# Patient Record
Sex: MALE | Race: WHITE | NOT HISPANIC OR LATINO | Employment: OTHER | URBAN - METROPOLITAN AREA
[De-identification: names, ages, dates, MRNs, and addresses within clinical notes are randomized per-mention and may not be internally consistent; named-entity substitution may affect disease eponyms.]

---

## 2024-02-11 ASSESSMENT — SLIT LAMP EXAM - LIDS
COMMENTS: GOOD POSITION, DERMATOCHALASIS
COMMENTS: GOOD POSITION, DERMATOCHALASIS

## 2024-02-11 ASSESSMENT — EXTERNAL EXAM - RIGHT EYE: OD_EXAM: NORMAL

## 2024-02-11 ASSESSMENT — EXTERNAL EXAM - LEFT EYE: OS_EXAM: NORMAL

## 2024-02-11 ASSESSMENT — CUP TO DISC RATIO
OD_RATIO: .2, PPA
OS_RATIO: .2, PPA

## 2024-02-11 NOTE — PROGRESS NOTES
Combined form of age-related cataract, right eyeH25.811  Combined form of age-related cataract, left eyeH25.812  -Mildly visually significant, but no impact on ADL`s. Symptoms: Denies trouble seeing signs when driving. Okay seeing words/numbers on TV. Monitor for now. F/u 1 year to re-evaluate -- recheck refraction, glare/BAT, dilation. Plan for Lenstar/OCT macula/Pentacam only if cataract(s) visually significant. F/u sooner if vision worsens before then.   -Advised to wear glasses for driving and for reading. Patient with natural monovision at this time, but he sees better at distance with glasses on, and feels he has to use glasses for reading. For surgical planning, consider plano aim OS to reduce anisometropia and allow for best uncorrected distance vision to match OD. Per daughter, patient does not read much.   Caution - Tamsulosin    XrxaxpunpB79.00  IcfubcL05.10  HyfdpcgarwA75.4  KzrvqhypoysybU66.31  -Difficult refraction, consider tumbling E's if unable to read letters/numbers  -Per patient's daughter, he does not drive much at this time.   -Likely long history of anisometropia with natural monovision since it is unlikely that he had a myopic shift in Rx from cataracts that long ago. Cataracts appear fairly symmetric between both eyes. Guarded visual prognosis with cataract surgery due to possible mild amblyopia OS due to anisometropia.  -Continue current glasses - has bifocals. May also consider separate DVO/NVO as needed.       No history of intraocular surgery/refractive surgery.   No FH of AMD/glaucoma    Language: Yony

## 2024-02-12 ENCOUNTER — OFFICE VISIT (OUTPATIENT)
Dept: OPHTHALMOLOGY | Facility: CLINIC | Age: 69
End: 2024-02-12
Payer: COMMERCIAL

## 2024-02-12 DIAGNOSIS — H25.811 COMBINED FORM OF AGE-RELATED CATARACT, RIGHT EYE: Primary | ICD-10-CM

## 2024-02-12 DIAGNOSIS — H52.4 PRESBYOPIA: ICD-10-CM

## 2024-02-12 DIAGNOSIS — H52.31 ANISOMETROPIA: ICD-10-CM

## 2024-02-12 DIAGNOSIS — H25.812 COMBINED FORM OF AGE-RELATED CATARACT, LEFT EYE: ICD-10-CM

## 2024-02-12 DIAGNOSIS — H52.12 MYOPIA OF LEFT EYE: ICD-10-CM

## 2024-02-12 DIAGNOSIS — H52.01 HYPEROPIA OF RIGHT EYE: ICD-10-CM

## 2024-02-12 PROCEDURE — 92014 COMPRE OPH EXAM EST PT 1/>: CPT | Performed by: OPHTHALMOLOGY

## 2024-02-12 RX ORDER — ATORVASTATIN CALCIUM 10 MG/1
10 TABLET, FILM COATED ORAL
COMMUNITY
Start: 2023-08-23

## 2024-02-12 ASSESSMENT — TONOMETRY
OS_IOP_MMHG: 18
IOP_METHOD: GOLDMANN APPLANATION
OD_IOP_MMHG: 19

## 2024-02-12 ASSESSMENT — REFRACTION_WEARINGRX
OD_SPHERE: +1.50
OS_CYLINDER: -0.50
OD_ADD: +2.00
OS_ADD: +2.00
OS_AXIS: 040
OS_SPHERE: -3.50
OD_CYLINDER: SPHERE

## 2024-02-12 ASSESSMENT — REFRACTION_MANIFEST
OD_SPHERE: +1.50
OD_ADD: +2.50
OD_CYLINDER: SPHERE
OS_CYLINDER: -0.50
OS_ADD: +2.50
OS_SPHERE: -3.25
OS_AXIS: 040

## 2024-02-12 ASSESSMENT — VISUAL ACUITY
OS_SC: 20/200
OS_BAT_MED: 20/40
METHOD: SNELLEN - LINEAR
OD_SC: 20/70
OD_BAT_MED: 20/40

## 2024-02-12 ASSESSMENT — ENCOUNTER SYMPTOMS: EYES NEGATIVE: 1

## 2024-02-12 NOTE — LETTER
2024      Jeana Jasmine MD  [address]  [facility] [address]      Patient name: Alayna Calabrese  Patient : 1955      Dear Jeana Jasmine MD,    I had the pleasure of seeing Alayna Calabrese for an eye examination. Please see below for my impression and plan. Thank you for allowing me to participate in the care of this very pleasant patient. Please do not hesitate to contact me if you have any questions.    Sincerely,  Capri Caraballo MD      Impression and Plan:  Combined form of age-related cataract, right eyeH25.811  Combined form of age-related cataract, left eyeH25.812  -Mildly visually significant, but no impact on ADL`s. Symptoms: Denies trouble seeing signs when driving. Okay seeing words/numbers on TV. Monitor for now. F/u 1 year to re-evaluate -- recheck refraction, glare/BAT, dilation. Plan for Lenstar/OCT macula/Pentacam only if cataract(s) visually significant. F/u sooner if vision worsens before then.   -Advised to wear glasses for driving and for reading. Patient with natural monovision at this time, but he sees better at distance with glasses on, and feels he has to use glasses for reading. For surgical planning, consider plano aim OS to reduce anisometropia and allow for best uncorrected distance vision to match OD. Per daughter, patient does not read much.   Caution - Tamsulosin    LwvcphtzjY76.00  ImgbyfU73.10  DvxmhsnpmbX45.4  YfdlwssinkwmoM44.31  -Difficult refraction, consider tumbling E's if unable to read letters/numbers  -Per patient's daughter, he does not drive much at this time.   -Likely long history of anisometropia with natural monovision since it is unlikely that he had a myopic shift in Rx from cataracts that long ago. Cataracts appear fairly symmetric between both eyes. Guarded visual prognosis with cataract surgery due to possible mild amblyopia OS due to anisometropia.  -Continue current glasses - has bifocals. May also consider  separate DVO/NVO as needed.

## 2024-02-13 ENCOUNTER — HOSPITAL ENCOUNTER (OUTPATIENT)
Dept: RADIOLOGY | Facility: EXTERNAL LOCATION | Age: 69
Discharge: HOME | End: 2024-02-13

## 2024-02-13 DIAGNOSIS — M25.532 LEFT WRIST PAIN: ICD-10-CM

## 2024-02-13 DIAGNOSIS — M25.522 LEFT ELBOW PAIN: ICD-10-CM

## 2024-02-15 ENCOUNTER — APPOINTMENT (OUTPATIENT)
Dept: ORTHOPEDIC SURGERY | Facility: HOSPITAL | Age: 69
End: 2024-02-15
Payer: COMMERCIAL

## 2024-02-19 ENCOUNTER — APPOINTMENT (OUTPATIENT)
Dept: ORTHOPEDIC SURGERY | Facility: CLINIC | Age: 69
End: 2024-02-19
Payer: COMMERCIAL

## 2024-02-20 ENCOUNTER — HOSPITAL ENCOUNTER (OUTPATIENT)
Dept: RADIOLOGY | Facility: CLINIC | Age: 69
Discharge: HOME | End: 2024-02-20
Payer: COMMERCIAL

## 2024-02-20 ENCOUNTER — OFFICE VISIT (OUTPATIENT)
Dept: ORTHOPEDIC SURGERY | Facility: CLINIC | Age: 69
End: 2024-02-20
Payer: COMMERCIAL

## 2024-02-20 DIAGNOSIS — M25.522 LEFT ELBOW PAIN: ICD-10-CM

## 2024-02-20 DIAGNOSIS — S42.492A CLOSED BICONDYLAR FRACTURE OF DISTAL HUMERUS, LEFT, INITIAL ENCOUNTER: Primary | ICD-10-CM

## 2024-02-20 PROCEDURE — 99204 OFFICE O/P NEW MOD 45 MIN: CPT | Performed by: STUDENT IN AN ORGANIZED HEALTH CARE EDUCATION/TRAINING PROGRAM

## 2024-02-20 PROCEDURE — 1126F AMNT PAIN NOTED NONE PRSNT: CPT | Performed by: STUDENT IN AN ORGANIZED HEALTH CARE EDUCATION/TRAINING PROGRAM

## 2024-02-20 PROCEDURE — 1159F MED LIST DOCD IN RCRD: CPT | Performed by: STUDENT IN AN ORGANIZED HEALTH CARE EDUCATION/TRAINING PROGRAM

## 2024-02-20 PROCEDURE — 73070 X-RAY EXAM OF ELBOW: CPT | Mod: LEFT SIDE | Performed by: RADIOLOGY

## 2024-02-20 PROCEDURE — 1160F RVW MEDS BY RX/DR IN RCRD: CPT | Performed by: STUDENT IN AN ORGANIZED HEALTH CARE EDUCATION/TRAINING PROGRAM

## 2024-02-20 PROCEDURE — 73070 X-RAY EXAM OF ELBOW: CPT | Mod: LT

## 2024-02-20 ASSESSMENT — PAIN SCALES - GENERAL: PAINLEVEL_OUTOF10: 0 - NO PAIN

## 2024-02-20 ASSESSMENT — PAIN - FUNCTIONAL ASSESSMENT: PAIN_FUNCTIONAL_ASSESSMENT: 0-10

## 2024-02-20 ASSESSMENT — PAIN DESCRIPTION - DESCRIPTORS: DESCRIPTORS: ACHING

## 2024-02-20 NOTE — PROGRESS NOTES
Orthopaedic Surgery  New Patient Clinic Note    Alayna Calabrese [id number] February 20, 2024    Reason for Consult: Left intra-articular distal humerus fracture    HPI: 68 y.o. right-hand-dominant male presenting with a ground-level fall at least 2 weeks ago.  He was complaining of left elbow pain but did not note this to his daughter until she was visiting a week later.  At that point he presented to urgent care and left elbow x-rays demonstrated an intra-articular distal humerus fracture.  He was placed into a splint and has remained in this since.  The swelling has improved.  He had denies any neurovascular issues.  Functionally he is retired and only does light work around the house.  He does some light gardening and house care but does not do any cooking or cleaning in the house.  He does not drive.  He does not have diabetes.  He does not smoke.    ROS:  A complete review of systems was conducted, pertinent only to the HPI noted above.     Constitutional: None     Eyes: No additions to above history     Ears, Nose, Throat: No additions to above history     Cardiovascular: No additions to above history     Respiratory: No additions to above history     GI: No additions to above history     : No additions to above history     Skin/Neuro: No additions to above history     Endocrine/Heme/Lymph: No additions to above history     Immunologic: No additions to above history     Psychiatric: No additions to above history     Musculoskeletal: see above    PMH:   Past Medical History:   Diagnosis Date    Personal history of other diseases of the circulatory system     History of hypertension    Unspecified cataract     Cataracts, both eyes    No history of DVT.     PSH:    Past Surgical History:   Procedure Laterality Date    OTHER SURGICAL HISTORY  03/08/2021    No history of surgery       SHx: No smoking. daily Alcohol, No IVDU    Meds:   Current Outpatient Medications on File Prior to Visit   Medication Sig Dispense  Refill    atorvastatin (Lipitor) 10 mg tablet Take 1 tablet (10 mg) by mouth once daily.       No current facility-administered medications on file prior to visit.       PHYSICAL EXAM    GEN: AaOx4, NAD  HEENT: normocephalic atraumatic, EOMI, MMM, pupils equal and round  PSYCH: appropriate mood and affect  RESP: nonlabored breathing on RA  CARDIAC: Extremities WWP, RRR to peripheral palpation  NEURO: CN 2-12 grossly intact    LUE:   - Skin closed with significant swelling from elbow to hand. No erythema, bruising, open wounds.  - ROM of L elbow from 30-90. TTP over affected area  -Motor/sensory intact in m/u/r  -hand wwp, brisk cap refill, 2+ radial pulse    Imaging:    XR of the left elbow, obtained and personally reviewed today, shows low distal humerus fracture with intra-articular split and possible comminution. There was interval displacement compared to prior xrays on 2/13.     Assessment/Plan:  68 y.o. male presenting with left distal humerus fracture after a ground level fall.  Patient is relatively healthy and active around the house with his upper extremities.  Given the fact that he had interval displacement on his repeat x-rays today it is likely that his fracture would not heal or it would heal in a severely malaligned position.  This would put him at significant risk for loss of range of motion and arthritis.  We have a long discussion with the patient and his family about the benefits and risks of operative versus nonoperative surgery.  Given the risk for diminished level of function with nonoperative surgery patient and family has elected for operative management.  We discussed the risks of surgery which included but are not limited to bleeding, infection, neurovascular injury, nonunion or malunion, persistent loss of range of motion, postoperative arthritis, and complications related to anesthesia including but not limited to death.  Patient and the family agreed to proceed with surgery and we will  place him on the schedule for next week after preoperative testing.  Surgery will be open reduction internal fixation of the left distal humerus with medial and lateral plates and possible olecranon osteotomy. We have also ordered a CT scan for preoperative planning.

## 2024-02-22 ENCOUNTER — HOSPITAL ENCOUNTER (OUTPATIENT)
Facility: CLINIC | Age: 69
Setting detail: OUTPATIENT SURGERY
End: 2024-02-22
Attending: STUDENT IN AN ORGANIZED HEALTH CARE EDUCATION/TRAINING PROGRAM | Admitting: STUDENT IN AN ORGANIZED HEALTH CARE EDUCATION/TRAINING PROGRAM
Payer: COMMERCIAL

## 2024-02-22 PROBLEM — S42.409B: Status: ACTIVE | Noted: 2024-02-21

## 2024-02-23 VITALS — WEIGHT: 143.96 LBS

## 2024-02-23 RX ORDER — LISINOPRIL 20 MG/1
20 TABLET ORAL 2 TIMES DAILY
COMMUNITY
Start: 2024-02-13

## 2024-02-23 RX ORDER — TAMSULOSIN HYDROCHLORIDE 0.4 MG/1
1 CAPSULE ORAL NIGHTLY
COMMUNITY
Start: 2021-10-25

## 2024-02-23 RX ORDER — NALTREXONE HYDROCHLORIDE 50 MG/1
TABLET, FILM COATED ORAL
COMMUNITY
Start: 2023-08-24

## 2024-02-27 ENCOUNTER — HOSPITAL ENCOUNTER (OUTPATIENT)
Dept: RADIOLOGY | Facility: CLINIC | Age: 69
Discharge: HOME | End: 2024-02-27
Payer: COMMERCIAL

## 2024-02-27 DIAGNOSIS — S42.492A CLOSED BICONDYLAR FRACTURE OF DISTAL HUMERUS, LEFT, INITIAL ENCOUNTER: ICD-10-CM

## 2024-02-27 PROCEDURE — 73200 CT UPPER EXTREMITY W/O DYE: CPT | Mod: LT

## 2024-03-01 ENCOUNTER — HOSPITAL ENCOUNTER (OUTPATIENT)
Facility: HOSPITAL | Age: 69
Setting detail: OUTPATIENT SURGERY
End: 2024-03-01
Attending: STUDENT IN AN ORGANIZED HEALTH CARE EDUCATION/TRAINING PROGRAM | Admitting: STUDENT IN AN ORGANIZED HEALTH CARE EDUCATION/TRAINING PROGRAM
Payer: COMMERCIAL

## 2024-03-01 PROBLEM — S42.409A HUMERUS DISTAL FRACTURE: Status: ACTIVE | Noted: 2024-02-21

## 2024-03-06 ENCOUNTER — APPOINTMENT (OUTPATIENT)
Dept: PREADMISSION TESTING | Facility: HOSPITAL | Age: 69
End: 2024-03-06
Payer: COMMERCIAL

## 2024-03-11 ENCOUNTER — OFFICE VISIT (OUTPATIENT)
Dept: ORTHOPEDIC SURGERY | Facility: HOSPITAL | Age: 69
End: 2024-03-11
Payer: COMMERCIAL

## 2024-03-11 DIAGNOSIS — S42.492A CLOSED BICONDYLAR FRACTURE OF DISTAL HUMERUS, LEFT, INITIAL ENCOUNTER: Primary | ICD-10-CM

## 2024-03-11 PROCEDURE — 1160F RVW MEDS BY RX/DR IN RCRD: CPT | Performed by: STUDENT IN AN ORGANIZED HEALTH CARE EDUCATION/TRAINING PROGRAM

## 2024-03-11 PROCEDURE — 1126F AMNT PAIN NOTED NONE PRSNT: CPT | Performed by: STUDENT IN AN ORGANIZED HEALTH CARE EDUCATION/TRAINING PROGRAM

## 2024-03-11 PROCEDURE — 1159F MED LIST DOCD IN RCRD: CPT | Performed by: STUDENT IN AN ORGANIZED HEALTH CARE EDUCATION/TRAINING PROGRAM

## 2024-03-11 PROCEDURE — 99024 POSTOP FOLLOW-UP VISIT: CPT | Performed by: STUDENT IN AN ORGANIZED HEALTH CARE EDUCATION/TRAINING PROGRAM

## 2024-03-11 NOTE — PROGRESS NOTES
Orthopaedic Surgery  New Patient Clinic Note    Alayna Calabrese [id number] March 11, 2024    Reason for Consult: Left intra-articular distal humerus fracture    HPI: 68 y.o. right-hand-dominant male presenting with a ground-level fall at least 2 weeks ago.  He was complaining of left elbow pain but did not note this to his daughter until she was visiting a week later.  At that point he presented to urgent care and left elbow x-rays demonstrated an intra-articular distal humerus fracture.  He was placed into a splint and has remained in this since.  The swelling has improved.  He had denies any neurovascular issues.  Functionally he is retired and only does light work around the house.  He does some light gardening and house care but does not do any cooking or cleaning in the house.  He does not drive.  He does not have diabetes.  He does not smoke.    3/11/2024 follow-up:  Patient is seen in clinic with his daughter present today.  The patient was originally planned to go open reduction internal fixation of distal humerus however did not get PAT testing done for his CT scan done.  We discussed nonoperative treatment.  He states he has no pain at rest.  He denies numbness and tingling.    ROS:  A complete review of systems was conducted, pertinent only to the HPI noted above.     Constitutional: None     Eyes: No additions to above history     Ears, Nose, Throat: No additions to above history     Cardiovascular: No additions to above history     Respiratory: No additions to above history     GI: No additions to above history     : No additions to above history     Skin/Neuro: No additions to above history     Endocrine/Heme/Lymph: No additions to above history     Immunologic: No additions to above history     Psychiatric: No additions to above history     Musculoskeletal: see above    PMH:   Past Medical History:   Diagnosis Date    Alcohol abuse     Hypertension     Personal history of other diseases of the circulatory  system     History of hypertension    Prediabetes     Psoriasis     Unspecified cataract     Cataracts, both eyes    No history of DVT.     PSH:    Past Surgical History:   Procedure Laterality Date    CYSTOSCOPY         SHx: No smoking. daily Alcohol, No IVDU    Meds:   Current Outpatient Medications on File Prior to Visit   Medication Sig Dispense Refill    atorvastatin (Lipitor) 10 mg tablet Take 1 tablet (10 mg) by mouth once daily.      lisinopril 20 mg tablet Take 1 tablet (20 mg) by mouth twice a day.      naltrexone (Depade) 50 mg tablet TAKE 1/2 TABLET BY MOUTH EVERY DAY FOR 7 DAYS THEN TAKE 1 TABLET BY MOUTH EVERY DAY      tamsulosin (Flomax) 0.4 mg 24 hr capsule Take 1 capsule (0.4 mg) by mouth once daily at bedtime.       No current facility-administered medications on file prior to visit.       PHYSICAL EXAM    GEN: AaOx4, NAD  HEENT: normocephalic atraumatic, EOMI, MMM, pupils equal and round  PSYCH: appropriate mood and affect  RESP: nonlabored breathing on RA  CARDIAC: Extremities WWP, RRR to peripheral palpation  NEURO: CN 2-12 grossly intact    LUE:   - Skin closed with mild swelling at the left elbow. No erythema, bruising, open wounds.  -Nontender to palpation to the distal humerus.  - ROM of L elbow from 50-90.  No pain with short arcs of motion  -Motor/sensory intact in m/u/r  -hand wwp, brisk cap refill, 2+ radial pulse    Imaging:    XR of the left elbow dated 2/20/2024 and 2/13/2024 reviewed in the office today  Multiple views of the left elbow demonstrate a bicondylar intra-articular distal radius fracture with mild displacement and comminution.      CT of the left elbow was reviewed in office today  There is an intra-articular distal radius fracture with a free articular fragment.  Overall the joint alignment is fairly well-maintained.  There is some displacement.  There is also callus formation.  There is heterotopic ossification in the anterior capsule as  well.        Assessment/Plan:  68 y.o. male presenting with left distal humerus fracture after a ground level fall.  The patient was originally planned for open reduction internal fixation however that he did not get his PAT testing done or a CT scan performed in a timely manner and the surgery was canceled by the patient's family..  After reviewing the CT scan his overall joint alignment looks well-maintained.  I did discuss that on his original x-rays his fracture appears much older than 2 weeks based on the level of callus formation present.    I discussed operative versus nonoperative treatment with the patient and his daughter.  Based on the level of chronicity, joint alignment, activity level, and that the patient was drinking nightly when he was initially evaluated that I would recommend nonoperative treatment in a cast.  He will be transition to a long-arm cast.  He will follow-up in 2 to 3 weeks for repeat x-rays out of cast.  At that time we will transition him to therapy    2 to 3 weeks with x-rays out of cast

## 2024-03-12 ENCOUNTER — APPOINTMENT (OUTPATIENT)
Dept: ORTHOPEDIC SURGERY | Facility: HOSPITAL | Age: 69
End: 2024-03-12
Payer: COMMERCIAL

## 2024-04-01 ENCOUNTER — OFFICE VISIT (OUTPATIENT)
Dept: ORTHOPEDIC SURGERY | Facility: HOSPITAL | Age: 69
End: 2024-04-01
Payer: COMMERCIAL

## 2024-04-01 ENCOUNTER — HOSPITAL ENCOUNTER (OUTPATIENT)
Dept: RADIOLOGY | Facility: HOSPITAL | Age: 69
Discharge: HOME | End: 2024-04-01
Payer: COMMERCIAL

## 2024-04-01 DIAGNOSIS — S59.902A ELBOW INJURY, LEFT, INITIAL ENCOUNTER: ICD-10-CM

## 2024-04-01 DIAGNOSIS — S59.902A ELBOW INJURY, LEFT, INITIAL ENCOUNTER: Primary | ICD-10-CM

## 2024-04-01 PROCEDURE — 73080 X-RAY EXAM OF ELBOW: CPT | Mod: LEFT SIDE | Performed by: RADIOLOGY

## 2024-04-01 PROCEDURE — 1159F MED LIST DOCD IN RCRD: CPT | Performed by: STUDENT IN AN ORGANIZED HEALTH CARE EDUCATION/TRAINING PROGRAM

## 2024-04-01 PROCEDURE — 99213 OFFICE O/P EST LOW 20 MIN: CPT | Performed by: STUDENT IN AN ORGANIZED HEALTH CARE EDUCATION/TRAINING PROGRAM

## 2024-04-01 PROCEDURE — 73080 X-RAY EXAM OF ELBOW: CPT | Mod: LT

## 2024-04-01 PROCEDURE — 1160F RVW MEDS BY RX/DR IN RCRD: CPT | Performed by: STUDENT IN AN ORGANIZED HEALTH CARE EDUCATION/TRAINING PROGRAM

## 2024-04-01 NOTE — PROGRESS NOTES
Orthopaedic Surgery  New Patient Clinic Note    Alayna Calabrese [id number] April 1, 2024    Reason for Consult: Left intra-articular distal humerus fracture    HPI: 68 y.o. right-hand-dominant male presenting with a ground-level fall at least 2 weeks ago.  He was complaining of left elbow pain but did not note this to his daughter until she was visiting a week later.  At that point he presented to urgent care and left elbow x-rays demonstrated an intra-articular distal humerus fracture.  He was placed into a splint and has remained in this since.  The swelling has improved.  He had denies any neurovascular issues.  Functionally he is retired and only does light work around the house.  He does some light gardening and house care but does not do any cooking or cleaning in the house.  He does not drive.  He does not have diabetes.  He does not smoke.    3/11/2024 follow-up:  Patient is seen in clinic with his daughter present today.  The patient was originally planned to go open reduction internal fixation of distal humerus however did not get PAT testing done for his CT scan done.  We discussed nonoperative treatment.  He states he has no pain at rest.  He denies numbness and tingling.    4/1/2024 Follow up:  Pt presents for follow up today. He has been in a cast since his last appointment. He denies pain. He denies numbness and tingling. He is overall doing well.  He is seen with his son-in-law present.    ROS:  A complete review of systems was conducted, pertinent only to the HPI noted above.     Constitutional: None     Eyes: No additions to above history     Ears, Nose, Throat: No additions to above history     Cardiovascular: No additions to above history     Respiratory: No additions to above history     GI: No additions to above history     : No additions to above history     Skin/Neuro: No additions to above history     Endocrine/Heme/Lymph: No additions to above history     Immunologic: No additions to above  history     Psychiatric: No additions to above history     Musculoskeletal: see above    PMH:   Past Medical History:   Diagnosis Date    Alcohol abuse     Hypertension     Personal history of other diseases of the circulatory system     History of hypertension    Prediabetes     Psoriasis     Unspecified cataract     Cataracts, both eyes    No history of DVT.     PSH:    Past Surgical History:   Procedure Laterality Date    CYSTOSCOPY         SHx: No smoking. daily Alcohol, No IVDU    Meds:   Current Outpatient Medications on File Prior to Visit   Medication Sig Dispense Refill    atorvastatin (Lipitor) 10 mg tablet Take 1 tablet (10 mg) by mouth once daily.      lisinopril 20 mg tablet Take 1 tablet (20 mg) by mouth twice a day.      naltrexone (Depade) 50 mg tablet TAKE 1/2 TABLET BY MOUTH EVERY DAY FOR 7 DAYS THEN TAKE 1 TABLET BY MOUTH EVERY DAY      tamsulosin (Flomax) 0.4 mg 24 hr capsule Take 1 capsule (0.4 mg) by mouth once daily at bedtime.       No current facility-administered medications on file prior to visit.       PHYSICAL EXAM    GEN: AaOx4, NAD  HEENT: normocephalic atraumatic, EOMI, MMM, pupils equal and round  PSYCH: appropriate mood and affect  RESP: nonlabored breathing on RA  CARDIAC: Extremities WWP, RRR to peripheral palpation  NEURO: CN 2-12 grossly intact    LUE:   -Cast removed-  Skin closed with mild swelling at the left elbow. No erythema, bruising, open wounds.  -Nontender to palpation to the distal humerus or elbow  - ROM of L elbow from 40-85.  No pain with short arcs of motion  -Motor/sensory intact in m/u/r  -hand wwp, brisk cap refill, 2+ radial pulse    Imaging:    XR of the left elbow taken and reviewed in office today dated 4/1/2024 these were compared to x-rays dated 2/20/2024 and 2/13/2024 reviewed in the office today  Multiple views of the left elbow demonstrate a bicondylar intra-articular distal radius fracture with moderate displacement and comminution.  There is  significant bridging callus present with heterotopic ossification.  There is still fracture line lucency especially to the lateral condyle.    CT of the left elbow was reviewed in office today  There is an intra-articular distal radius fracture with a free articular fragment.  Overall the joint alignment is fairly well-maintained.  There is some displacement.  There is also callus formation.  There is heterotopic ossification in the anterior capsule as well.        Assessment/Plan:  68 y.o. male presenting with left distal humerus fracture after a ground level fall.  The patient was originally planned for open reduction internal fixation however that he did not get his PAT testing done or a CT scan performed in a timely manner and the surgery was canceled by the patient's family..  After reviewing the CT scan his overall joint alignment looks well-maintained.  I did discuss that on his original x-rays his fracture appears much older than 2 weeks based on the level of callus formation present.    I discussed operative versus nonoperative treatment with the patient and his daughter.  Based on the level of chronicity, joint alignment, activity level, and that the patient was drinking nightly when he was initially evaluated that I recommend nonoperative treatment in a cast.      We will discontinue the casting.  He will be transitioned into therapy.  He should work on range of motion.  He will still be nonweightbearing to the left upper extremity.    He will follow-up in 4 weeks.

## 2024-04-08 ENCOUNTER — APPOINTMENT (OUTPATIENT)
Dept: OCCUPATIONAL THERAPY | Facility: CLINIC | Age: 69
End: 2024-04-08
Payer: COMMERCIAL

## 2024-04-11 ENCOUNTER — EVALUATION (OUTPATIENT)
Dept: OCCUPATIONAL THERAPY | Facility: CLINIC | Age: 69
End: 2024-04-11
Payer: COMMERCIAL

## 2024-04-11 DIAGNOSIS — S59.902D: ICD-10-CM

## 2024-04-11 DIAGNOSIS — S59.902A ELBOW INJURY, LEFT, INITIAL ENCOUNTER: ICD-10-CM

## 2024-04-11 DIAGNOSIS — M25.622 DECREASED RANGE OF MOTION OF LEFT ELBOW: Primary | ICD-10-CM

## 2024-04-11 PROCEDURE — 97110 THERAPEUTIC EXERCISES: CPT | Mod: GO

## 2024-04-11 PROCEDURE — 97165 OT EVAL LOW COMPLEX 30 MIN: CPT | Mod: GO

## 2024-04-11 NOTE — PROGRESS NOTES
"  Occupational Therapy Orthopedic Evaluation    Patient Name: Alayna Calabrese  MRN: [id number]  Today's Date: 4/11/2024     Insurance:  Visit number: 1  Insurance Type: [employer]  Authorization info: [place]    General:  Reason for visit: Left elbow injury  Referred by: Dr. Angel Hickey DO    Current Problem  1. Decreased range of motion of left elbow        2. Elbow injury, left, initial encounter  Referral to Occupational Therapy      3. Injury of elbow, left, subsequent encounter          Precautions:    Medical History Form: Reviewed (scanned into chart)  Diagnoses pertinent to therapy: HTN     SUBJECTIVE:   68 y.o. male patient is accompanied by his wife and family member who provided translation.   Pt s/p distal humerus fracture.     COLETTE: Per family report, a closet door fell on top of pt  Date of onset: Approx 3 months ago  Date of surgery: Treated conservatively   Chief complaints/concerns from patient/family member: Increased difficulty and slight pain with UB ADLS, left hand feeding, Pt unable to sleep on left side/arm. Pt unable to complete household fxal lifting, yard work.     Hand Dominance: Ambidextrous, but primarily right handed    Pain:   Location: \"little pain\" with ROM Left elbow. Pt unable to quantify on pain scale due to language barrier.   At rest :               Fxal use/movement:         Worst:    Description/Type:   Aggravating Factors: Motion  Relieving Factors:     Relevant Information (PMH & Previous Tests/Imaging):   Previous Interventions/Treatments: None    Prior Level of Function (PLOF)  Previous ADL/IADL Status:  Independent   Work/School: Unknown  Leisure/Hobbies: None stated    Patients Living Environment: Lives with wife    Primary Language: Punri. Pt needs      Pt goals for therapy: Do everything he used to.    OBJECTIVE:     ROM:  Elbow/Forearm AROM (Degrees of motion)    R L   Extension WFL and distally  -30   Flexion  62   Pronation  78   Supination  88      Wrist AROM  (Degrees " of motion)    R L   Extension WFL/WNL WFL    Flexion     Radial Deviation     Ulnar Deviation        Composite fist/digit AROM:WFL   Thumb AROM: WFL    Hand Strength Measures: LBS   R L   Dynamometer  47 23   Lateral Pinch 18 15   3jaw Pinch  Tip Pinch 12  NE 10  NE      Physical Observation:   Edema: Circ elbow : R 27 cm   L 29.2 cm  Paresthesias: Intact , no complaints   Scar/Incision: NA  Coordination: WFL, grossly     Quick Dash outcome measurement:  30 %    Red Flags: Do you have any of the following? No  Fever/chills, unexplained weight changes, dizziness/fainting, unexplained change in bowel or bladder functions, unexplained malaise or muscle weakness, night pain/sweats, numbness or tingling    Treatment:     OT evaluation completed and HEP issued.   Patient fitted and issued  tubigrip F sleeve for edema reduction at elbow. Wear, care and precautions instructed to patient/family. He reports comfortable fit and family verbalizes good understanding of wear and precautions.  Patient education on rationale, benefits and timing of MH and CP use to decrease symptoms.    HEP: AAROM elbow flexion/extension clasped hands, Push/pull AAROM clasp hands/broomstick,  Supine: left elbow flex/exten AROM, Supination/pronation AROM ex  Issued red therapy ball for  strength    Pt requires max cueing from OT and family members for correct exercise form.   Written and illustrated handouts issued to patient and family     ASSESSMENT:   Patient is a 68 y.o. male  with the diagnosis of left elbow injury resulting in limited ability and participation in ADLs, IADL activities.. Pt demonstrating increased elbow swelling, pain and decreased ROM and strength. Pt would benefit from skilled Occupational Therapy to address the above deficits in order to return to functional activities as able with increased independence.     PLAN:  Goals:  Active       OT Goals       Patient to be independent with edema reduction techs , in order to  decrease edema by 1 cm.       Start:  04/11/24    Expected End:  06/10/24            Pt to improve left elbow flexion to > 90 degrees for increased ease, ability for UB ADLS. Pt to improve elbow extension to > -20 degrees for ease with fxal reaching.        Start:  04/11/24    Expected End:  06/10/24            Patient to increase  strength to 32# of hand for ease with lifting and carrying tasks.        Start:  04/11/24    Expected End:  06/10/24            Pt to increase fxal use of left arm for increased participation in ADLS with reported less assist from family members.        Start:  04/11/24    Expected End:  06/10/24               OT Problem       PATIENT WILL DEMONSTRATE INDEPENDENCE IN HOME PROGRAM FOR SUPPORT OF PROGRESSION       Start:  04/11/24    Expected End:  06/10/24              Planned Interventions include: therapeutic exercise, therapeutic activity, self-care home management, manual therapy, neuromuscular education , electric stimulation, fluidotherapy, ultrasound, Home exercise program, orthosis fabrication, wound care/scar management.     Frequency: 1 x week  Duration: 4 weeks    Rehab Potential: Fair   Plan of care was developed with input and agreement by the patient and family.      Penelope Sung MS, OTR/L [id number]

## 2024-04-18 ENCOUNTER — TREATMENT (OUTPATIENT)
Dept: OCCUPATIONAL THERAPY | Facility: CLINIC | Age: 69
End: 2024-04-18
Payer: COMMERCIAL

## 2024-04-18 DIAGNOSIS — S59.902D: ICD-10-CM

## 2024-04-18 DIAGNOSIS — M25.622 DECREASED RANGE OF MOTION OF LEFT ELBOW: Primary | ICD-10-CM

## 2024-04-18 PROCEDURE — 97140 MANUAL THERAPY 1/> REGIONS: CPT | Mod: GO

## 2024-04-18 PROCEDURE — 97110 THERAPEUTIC EXERCISES: CPT | Mod: GO

## 2024-04-19 NOTE — PROGRESS NOTES
Occupational Therapy   Occupational Therapy Treatment    Patient Name: Alayna Calabrese  MRN: [id number]  Today's Date: 4/18/2024     Insurance:  Visit number: 2  Insurance Type: [employer]  Authorization info: [place]    Current Problem  1. Decreased range of motion of left elbow        2. Injury of elbow, left, subsequent encounter  Follow Up In Occupational Therapy        Precautions     SUBJECTIVE:   Patient is accompanied by his wife and son, who translate throughout OT tx session.   Son reports pt is complaint with exercises issued at evaluation.     Pain:    No pain reports  Location:   Description:     Performing HEP?: Yes    OBJECTIVE:   Decreased muscle guarding palpated with manual stretching   Treatment:    Modalities:   min      Therapeutic Exercise:  15  min  Supine: chest press ,elbow flex/extension AAROM cane, clasp hands 5 reps x 2 sets  Supine: SH flexion coupled w/ ER AAROM cane x 8  Supination/pronation AAROM cane x 8 reps each (added to HEP)  Wall slides elbow flex/extension A/AAROM x 10     Manual Therapy: 25  min  STM to right biceps, wrist flexors/extensors  Gentle PROM/AAROM elbow all planes and distally  Contract/relax techs for elbow flexion x 1    Therapeutic Activity:     min      Neuromuscular Re-education:  min    Orthosis:   min      Wound Care:     min      Self Care/ADL   min      Other Treatment:   min      ASSESSMENT:  Pt requiring max cueing from family members for correct exercise techs. Visible decrease in swelling since IE, with increased UE ability for reaching towards mouth and head.     PLAN:   Continue with POC 1 x week . Measure AROM    Penelope Sung MS, OTR/L [id number]

## 2024-04-25 ENCOUNTER — TREATMENT (OUTPATIENT)
Dept: OCCUPATIONAL THERAPY | Facility: CLINIC | Age: 69
End: 2024-04-25
Payer: COMMERCIAL

## 2024-04-25 DIAGNOSIS — M25.622 DECREASED RANGE OF MOTION OF LEFT ELBOW: Primary | ICD-10-CM

## 2024-04-25 PROCEDURE — 97140 MANUAL THERAPY 1/> REGIONS: CPT | Mod: GO

## 2024-04-25 NOTE — PROGRESS NOTES
Occupational Therapy   Occupational Therapy Treatment    Patient Name: Alayna Calabrese  MRN: [id number]  Today's Date: 4/25/2024     Insurance:  Visit number: 3  Insurance Type: [employer]  Authorization info: [place]    Current Problem  1. Decreased range of motion of left elbow          Precautions     SUBJECTIVE:   Patient is accompanied by his wife and daughter , who assists in translating. She reports pt is still unable to use left hand for feeding.     Pain:   Slight pain reports with PROM/AAROM exs in clinic  Location: Left elbow   Description:     Performing HEP?: Yes    OBJECTIVE:   AROM: post tx session   Elbow flexion 92 degrees from 62  Elbow extension-26 from -30    Treatment:    Modalities:   min      Therapeutic Exercise:  5  min  Supine: chest press ,elbow flex/extension AAROM  clasp hands 5 reps x 2 sets  AROM measurements taken    Manual Therapy: 25  min  STM to right biceps, wrist flexors/extensors  Gentle PROM/AAROM elbow all planes and distally  Therapeutic Activity:     min      Neuromuscular Re-education:  min    Orthosis:   min      Wound Care:     min      Self Care/ADL   min      Other Treatment:   min  Additional tubigrip sleeve E fitted and issued    ASSESSMENT:  Visible decrease in swelling noted at left elbow. Goal is to return to gardening and using left arm. Steady increases in elbow AROM.    PLAN:   Continue with POC 1 x week .    Penelope Sung MS, OTR/L [id number]

## 2024-05-02 ENCOUNTER — TREATMENT (OUTPATIENT)
Dept: OCCUPATIONAL THERAPY | Facility: CLINIC | Age: 69
End: 2024-05-02
Payer: COMMERCIAL

## 2024-05-02 DIAGNOSIS — S59.902D: Primary | ICD-10-CM

## 2024-05-02 PROCEDURE — 97140 MANUAL THERAPY 1/> REGIONS: CPT | Mod: GO

## 2024-05-02 PROCEDURE — 97110 THERAPEUTIC EXERCISES: CPT | Mod: GO

## 2024-05-03 NOTE — PROGRESS NOTES
Occupational Therapy   Occupational Therapy Treatment    Patient Name: Alayna Calabrese  MRN: [id number]  Today's Date: 5/2/2024     Insurance:  Visit number: 4  Insurance Type: [employer]  [place]ization info: [place]    Current Problem  1. Injury of elbow, left, subsequent encounter          Precautions     SUBJECTIVE:   Patient reports he is able to use left hand for feeding with increased effort. Also able complete dressing and wash face modified independent level.     Pain:   0/10  Location: Left elbow   Description:     Performing HEP?: Yes    OBJECTIVE:   Per observation, medial elbow swelling persists, however has overall decreased.    Treatment:    Modalities:  5 min  MH x 5 min left elbow     Therapeutic Exercise: 10  min  Supine: LUE PROM/AAROM elbow flex/extension x 10 (Added to HEP)  Seat: Pulleys elbow flex/exten AAROM x 10     Manual Therapy: 20  min  STM to right biceps, wrist flexors/extensors, elbow jt distraction   Gentle PROM/AAROM elbow all planes and distally  Therapeutic Activity:     min    Neuromuscular Re-education:  min    Orthosis:   min      Wound Care:     min      Self Care/ADL   min      Other Treatment:   min    ASSESSMENT:  Steady improvements with using left arm for daily fxal activities. Decreased muscle guarding and tightness palpated with manual techs    PLAN:   Continue with POC 1 x week .    Penelope Sung MS, OTR/L [id number]

## 2024-05-06 ENCOUNTER — OFFICE VISIT (OUTPATIENT)
Dept: ORTHOPEDIC SURGERY | Facility: HOSPITAL | Age: 69
End: 2024-05-06
Payer: COMMERCIAL

## 2024-05-06 DIAGNOSIS — S42.492A CLOSED BICONDYLAR FRACTURE OF DISTAL HUMERUS, LEFT, INITIAL ENCOUNTER: Primary | ICD-10-CM

## 2024-05-06 PROCEDURE — 1159F MED LIST DOCD IN RCRD: CPT | Performed by: STUDENT IN AN ORGANIZED HEALTH CARE EDUCATION/TRAINING PROGRAM

## 2024-05-06 PROCEDURE — 99213 OFFICE O/P EST LOW 20 MIN: CPT | Performed by: STUDENT IN AN ORGANIZED HEALTH CARE EDUCATION/TRAINING PROGRAM

## 2024-05-06 PROCEDURE — 1160F RVW MEDS BY RX/DR IN RCRD: CPT | Performed by: STUDENT IN AN ORGANIZED HEALTH CARE EDUCATION/TRAINING PROGRAM

## 2024-05-06 ASSESSMENT — PAIN SCALES - GENERAL: PAINLEVEL_OUTOF10: 0 - NO PAIN

## 2024-05-06 ASSESSMENT — PAIN - FUNCTIONAL ASSESSMENT: PAIN_FUNCTIONAL_ASSESSMENT: 0-10

## 2024-05-06 NOTE — PROGRESS NOTES
Orthopaedic Surgery  New Patient Clinic Note    Alayna Calabrese [id number] May 6, 2024    Reason for Consult: Left intra-articular distal humerus fracture    HPI: 68 y.o. right-hand-dominant male presenting with a ground-level fall at least 2 weeks ago.  He was complaining of left elbow pain but did not note this to his daughter until she was visiting a week later.  At that point he presented to urgent care and left elbow x-rays demonstrated an intra-articular distal humerus fracture.  He was placed into a splint and has remained in this since.  The swelling has improved.  He had denies any neurovascular issues.  Functionally he is retired and only does light work around the house.  He does some light gardening and house care but does not do any cooking or cleaning in the house.  He does not drive.  He does not have diabetes.  He does not smoke.    3/11/2024 follow-up:  Patient is seen in clinic with his daughter present today.  The patient was originally planned to go open reduction internal fixation of distal humerus however did not get PAT testing done for his CT scan done.  We discussed nonoperative treatment.  He states he has no pain at rest.  He denies numbness and tingling.    4/1/2024 Follow up:  Pt presents for follow up today. He has been in a cast since his last appointment. He denies pain. He denies numbness and tingling. He is overall doing well.  He is seen with his son-in-law present.    5/6/2024:  Patient denies any hip pain.  He is very happy with his outcome.  He has been attending occupational hand therapy.  He states that his elbow is not limiting him.  He notes some swelling to his elbow.  He denies numbness and tingling    ROS:  A complete review of systems was conducted, pertinent only to the HPI noted above.     Constitutional: None     Eyes: No additions to above history     Ears, Nose, Throat: No additions to above history     Cardiovascular: No additions to above history     Respiratory: No  additions to above history     GI: No additions to above history     : No additions to above history     Skin/Neuro: No additions to above history     Endocrine/Heme/Lymph: No additions to above history     Immunologic: No additions to above history     Psychiatric: No additions to above history     Musculoskeletal: see above    PMH:   Past Medical History:   Diagnosis Date    Alcohol abuse     Hypertension     Personal history of other diseases of the circulatory system     History of hypertension    Prediabetes     Psoriasis     Unspecified cataract     Cataracts, both eyes    No history of DVT.     PSH:    Past Surgical History:   Procedure Laterality Date    CYSTOSCOPY         SHx: No smoking. daily Alcohol, No IVDU    Meds:   Current Outpatient Medications on File Prior to Visit   Medication Sig Dispense Refill    atorvastatin (Lipitor) 10 mg tablet Take 1 tablet (10 mg) by mouth once daily.      lisinopril 20 mg tablet Take 1 tablet (20 mg) by mouth twice a day.      naltrexone (Depade) 50 mg tablet TAKE 1/2 TABLET BY MOUTH EVERY DAY FOR 7 DAYS THEN TAKE 1 TABLET BY MOUTH EVERY DAY      tamsulosin (Flomax) 0.4 mg 24 hr capsule Take 1 capsule (0.4 mg) by mouth once daily at bedtime.       No current facility-administered medications on file prior to visit.       PHYSICAL EXAM    GEN: AaOx4, NAD  HEENT: normocephalic atraumatic, EOMI, MMM, pupils equal and round  PSYCH: appropriate mood and affect  RESP: nonlabored breathing on RA  CARDIAC: Extremities WWP, RRR to peripheral palpation  NEURO: CN 2-12 grossly intact    LUE:   Skin closed with mild swelling at the left elbow. No erythema, bruising, open wounds.  -Nontender to palpation to the distal humerus or elbow  - ROM of L elbow from 30-95.  No pain with range of motion  -Motor/sensory intact in m/u/r  -hand wwp, brisk cap refill, 2+ radial pulse    Imaging:    XR of the left elbow  reviewed in office today. XR dated 4/1/2024 these were compared to x-rays  dated 2/20/2024 and 2/13/2024 reviewed in the office today  Multiple views of the left elbow demonstrate a bicondylar intra-articular distal radius fracture with moderate displacement and comminution.  There is significant bridging callus present with heterotopic ossification.  There is still fracture line lucency especially to the lateral condyle.    CT of the left elbow was reviewed in office today  There is an intra-articular distal radius fracture with a free articular fragment.  Overall the joint alignment is fairly well-maintained.  There is some displacement.  There is also callus formation.  There is heterotopic ossification in the anterior capsule as well.        Assessment/Plan:  68 y.o. male presenting with left distal humerus fracture after a ground level fall.  The patient was originally planned for open reduction internal fixation however that he did not get his PAT testing done or a CT scan performed in a timely manner and the surgery was canceled by the patient's family.  After reviewing the CT scan his overall joint alignment looks well-maintained.  I did discuss that on his original x-rays his fracture appears much older than 2 weeks based on the level of callus formation present.   We eventually decided on nonoperative treatment in the form of casting.  This was based on the chronicity of the injury it is overall joint alignment.    He was transitioned to therapy last visit.  He does have limited range of motion however he is happy with his outcome and denies pain.    He will follow-up as needed

## 2025-02-12 ASSESSMENT — CUP TO DISC RATIO
OS_RATIO: .2, PPA
OD_RATIO: .2, PPA

## 2025-02-12 ASSESSMENT — EXTERNAL EXAM - LEFT EYE: OS_EXAM: NORMAL

## 2025-02-12 ASSESSMENT — EXTERNAL EXAM - RIGHT EYE: OD_EXAM: NORMAL

## 2025-02-12 ASSESSMENT — SLIT LAMP EXAM - LIDS
COMMENTS: GOOD POSITION, DERMATOCHALASIS
COMMENTS: GOOD POSITION, DERMATOCHALASIS

## 2025-02-12 NOTE — PROGRESS NOTES
Combined form of age-related cataract, right eyeH25.811  Combined form of age-related cataract, left eyeH25.812  -Mildly visually significant, but no impact on ADL`s. Symptoms: Denies trouble seeing signs when driving. Okay seeing words/numbers on TV. Monitor for now. F/u 1 year to re-evaluate -- recheck refraction, glare/BAT, dilation. Plan for Lenstar/OCT macula/Pentacam only if cataract(s) visually significant. F/u sooner if vision worsens before then.   -Patient does not wear glasses, does not drive. Patient with natural monovision at this time. For surgical planning, consider plano aim OS to reduce anisometropia and allow for best uncorrected distance vision to match OD. Per daughter, patient does not read much.   Caution - Tamsulosin    EcishryhvH56.00  PnnnswT63.10  DwlgveldahR72.4  YqskpvopolipuK73.31  -Difficult refraction, consider tumbling E's if unable to read letters/numbers  -Per patient's daughter, he does not drive much at this time.   -Likely long history of anisometropia with natural monovision since it is unlikely that he had a myopic shift in Rx from cataracts that long ago. Cataracts appear fairly symmetric between both eyes. Guarded visual prognosis with cataract surgery due to possible mild amblyopia OS due to anisometropia.  -Continue current glasses - has bifocals. May also consider separate DVO/NVO as needed.       No history of intraocular surgery/refractive surgery.   No FH of AMD/glaucoma    Language: Yony

## 2025-02-17 ENCOUNTER — APPOINTMENT (OUTPATIENT)
Dept: OPHTHALMOLOGY | Facility: CLINIC | Age: 70
End: 2025-02-17
Payer: COMMERCIAL

## 2025-02-17 DIAGNOSIS — H25.812 COMBINED FORM OF AGE-RELATED CATARACT, LEFT EYE: ICD-10-CM

## 2025-02-17 DIAGNOSIS — H52.4 PRESBYOPIA: ICD-10-CM

## 2025-02-17 DIAGNOSIS — H25.811 COMBINED FORM OF AGE-RELATED CATARACT, RIGHT EYE: Primary | ICD-10-CM

## 2025-02-17 DIAGNOSIS — H52.12 MYOPIA OF LEFT EYE: ICD-10-CM

## 2025-02-17 DIAGNOSIS — H52.01 HYPEROPIA OF RIGHT EYE: ICD-10-CM

## 2025-02-17 DIAGNOSIS — H52.31 ANISOMETROPIA: ICD-10-CM

## 2025-02-17 PROCEDURE — 92014 COMPRE OPH EXAM EST PT 1/>: CPT | Performed by: OPHTHALMOLOGY

## 2025-02-17 ASSESSMENT — ENCOUNTER SYMPTOMS
PSYCHIATRIC NEGATIVE: 0
ENDOCRINE NEGATIVE: 0
ALLERGIC/IMMUNOLOGIC NEGATIVE: 0
GASTROINTESTINAL NEGATIVE: 0
EYES NEGATIVE: 1
NEUROLOGICAL NEGATIVE: 0
CONSTITUTIONAL NEGATIVE: 0
CARDIOVASCULAR NEGATIVE: 0
RESPIRATORY NEGATIVE: 0
MUSCULOSKELETAL NEGATIVE: 0
HEMATOLOGIC/LYMPHATIC NEGATIVE: 0

## 2025-02-17 ASSESSMENT — REFRACTION_MANIFEST
OD_ADD: +2.50
OD_CYLINDER: SPHERE
OS_SPHERE: -3.00
OS_ADD: +2.50
OS_AXIS: 055
OD_SPHERE: +1.50
OS_CYLINDER: -0.25

## 2025-02-17 ASSESSMENT — CONF VISUAL FIELD
OD_SUPERIOR_NASAL_RESTRICTION: 0
OS_SUPERIOR_NASAL_RESTRICTION: 0
OD_INFERIOR_NASAL_RESTRICTION: 0
OS_INFERIOR_NASAL_RESTRICTION: 0
OD_INFERIOR_TEMPORAL_RESTRICTION: 0
OD_SUPERIOR_TEMPORAL_RESTRICTION: 0
OD_NORMAL: 1
OS_NORMAL: 1
OS_SUPERIOR_TEMPORAL_RESTRICTION: 0
OS_INFERIOR_TEMPORAL_RESTRICTION: 0

## 2025-02-17 ASSESSMENT — VISUAL ACUITY
METHOD: TUMBLING E'S
OS_CC: 20/60
OD_CC: 20/50
OD_BAT_MED: 20/40

## 2025-02-17 ASSESSMENT — REFRACTION_WEARINGRX
OD_CYLINDER: SPHERE
OD_SPHERE: +1.00
OS_SPHERE: -3.00
OD_ADD: +2.00
OS_AXIS: 055
OS_ADD: +2.00
OS_CYLINDER: -0.25

## 2025-02-17 ASSESSMENT — TONOMETRY
OS_IOP_MMHG: 17
OD_IOP_MMHG: 16
IOP_METHOD: GOLDMANN APPLANATION

## 2026-02-23 ENCOUNTER — APPOINTMENT (OUTPATIENT)
Dept: OPHTHALMOLOGY | Facility: CLINIC | Age: 71
End: 2026-02-23